# Patient Record
Sex: FEMALE
[De-identification: names, ages, dates, MRNs, and addresses within clinical notes are randomized per-mention and may not be internally consistent; named-entity substitution may affect disease eponyms.]

---

## 2017-08-28 ENCOUNTER — HOSPITAL ENCOUNTER (EMERGENCY)
Dept: HOSPITAL 42 - ED | Age: 57
Discharge: HOME | End: 2017-08-28
Payer: COMMERCIAL

## 2017-08-28 VITALS — RESPIRATION RATE: 18 BRPM | TEMPERATURE: 98.4 F

## 2017-08-28 VITALS — HEART RATE: 67 BPM | DIASTOLIC BLOOD PRESSURE: 60 MMHG | OXYGEN SATURATION: 100 % | SYSTOLIC BLOOD PRESSURE: 113 MMHG

## 2017-08-28 VITALS — BODY MASS INDEX: 27.6 KG/M2

## 2017-08-28 DIAGNOSIS — M54.5: Primary | ICD-10-CM

## 2017-08-28 NOTE — ED PDOC
Arrival/HPI





- General


Historian: Patient





- History of Present Illness


Time/Duration: < week


Symptom Onset: Gradual


Symptom Course: Unchanged


Severity Level: Mild


Activities at Onset: Light


Context: Home





- General


Chief Complaint: Upper Extremity Problem/Injury


Time Seen by Provider: 08/28/17 07:42





- History of Present Illness


Narrative History of Present Illness (Text): 





08/28/17 07:45





Milagro De Leon is 57 year old female, whose past medical history includes 

hypertension, diabetes, GERD, and multiple hernia repairs, who presents to the 

emergency department complaining of left lower back pain for 3 days. Patient 

states that her pain worsens when she lies down but is better when area is 

palpated. Patient notes that she took Naproxen 500 mg which brought little 

relief.  Patient also indicates a pinched nerve to her right shoulder which 

shes states is chronic and has been intermittent for a few years. Patient 

denies any hematuria, nausea, vomiting, diarrhea, constipation, or any other 

complaints at this time.





PMD: Reese Smith)





Past Medical History





- Provider Review


Nursing Documentation Reviewed: Yes





- Infectious Disease


Hx of Infectious Diseases: None





- Tetanus Immunization


Tetanus Immunization: Unknown





- Reproductive


Menopause: Yes





- Cardiac


Hx Cardiac Disorders: Yes


Hx Hypertension: Yes


Hx Pacemaker: No





- Pulmonary


Hx Respiratory Disorders: No





- Neurological


Hx Neurological Disorder: No


Hx Paralysis: No





- HEENT


Hx HEENT Disorder: No





- Renal


Hx Renal Disorder: No





- Endocrine/Metabolic


Hx Endocrine Disorders: Yes


Hx Diabetes Mellitus Type 2: Yes





- Hematological/Oncological


Hx Blood Disorders: No


Hx Blood Transfusions: No


Hx Blood Transfusion Reaction: No





- Integumentary


Hx Dermatological Disorder: No





- Musculoskeletal/Rheumatological


Hx Musculoskeletal Disorders: No





- Gastrointestinal


Hx Gastrointestinal Disorders: No


Hx Gastroesophageal Reflux: Yes





- Genitourinary/Gynecological


Hx Genitourinary Disorders: No





- Psychiatric


Hx Psychophysiologic Disorder: No


Hx Physical Abuse: No


Hx Substance Use: No





- Surgical History


Other/Comment: Abdominal Hernia Repair





- Anesthesia


Hx Anesthesia: Yes


Hx Anesthesia Reactions: No


Hx Malignant Hyperthermia: No





- Suicidal Assessment


Feels Threatened In Home Enviroment: No





Family/Social History





- Physician Review


Nursing Documentation Reviewed: Yes


Family/Social History: Diabetes, Hypertension


Smoking Status: Never Smoked


Hx Alcohol Use: No


Hx Substance Use: No


Hx Substance Use Treatment: No





Allergies/Home Meds


Allergies/Adverse Reactions: 


Allergies





No Known Allergies Allergy (Verified 08/28/17 07:44)


 








Home Medications: 


 Home Meds











 Medication  Instructions  Recorded  Confirmed


 


Enalapril Maleate [Enalapril 10 mg PO DAILY 02/17/16 08/28/17





Maleate]   


 


Metformin HCl [Glucophage] 500 mg PO BID 02/17/16 08/28/17














Review of Systems





- Review of Systems


Constitutional: absent: Fevers, Night Sweats


Eyes: absent: Vision Changes


ENT: absent: Hearing Changes


Respiratory: absent: SOB


Cardiovascular: absent: Chest Pain


Gastrointestinal: absent: Abdominal Pain


Genitourinary Female: absent: Dysuria


Musculoskeletal: Back Pain, Other (Joint )


Skin: absent: Rash


Neurological: absent: Headache





Physical Exam


Vital Signs Reviewed: Yes


Temperature: Afebrile


Blood Pressure: Hypertensive


Pulse: Regular


Respiratory Rate: Normal


Appearance: Positive for: Well-Appearing, Non-Toxic, Comfortable


Pain Distress: None


Mental Status: Positive for: Alert and Oriented X 3





- Systems Exam


Head: Present: Atraumatic, Normocephalic


Pupils: Present: PERRL


Extroacular Muscles: Present: EOMI


Conjunctiva: Present: Normal


Mouth: Present: Moist Mucous Membranes


Neck: Present: Normal Range of Motion


Respiratory/Chest: Present: Clear to Auscultation, Good Air Exchange.  No: 

Respiratory Distress, Accessory Muscle Use


Cardiovascular: Present: Regular Rate and Rhythm, Normal S1, S2.  No: Murmurs


Abdomen: Present: Normal Bowel Sounds.  No: Tenderness, Distention, Peritoneal 

Signs


Back: Present: Paraspinal Tenderness (slight tenderness to left lower area)


Upper Extremity: Present: Normal Inspection.  No: Cyanosis, Edema


Lower Extremity: Present: Normal Inspection.  No: Edema


Neurological: Present: GCS=15, CN II-XII Intact, Speech Normal


Skin: Present: Warm, Dry, Normal Color.  No: Rashes


Psychiatric: Present: Alert, Oriented x 3, Normal Insight, Normal Concentration


Vital Signs











  Temp Pulse Resp BP Pulse Ox


 


 08/28/17 09:45   67  18  113/60  100


 


 08/28/17 07:46  98.4 F  76  18  128/94 H  98














Medical Decision Making


ED Course and Treatment: 





08/28/17 08:03


Impression:





57 year old female complaining of left lower back pain for 3 days.   





Differential Diagnosis included but are not limited to:  Muscle Strain vs. Disc 

herniation





Plan:


-- Lidoderm, Tylenol, and Flexeril


-- Reassess and disposition





Prior Visits:


Notes and results from previous visits were reviewed. Patient last seen in the 

ED on 03/11/17 for 4-day duration of eye redness. Patient was discharged home. 





Progress Notes:








08/28/17 10:11


On  reevaluation, patient felt much better. She was able to move from laying to 

sitting and then walked with no pain or ataxia. Patient will make sure to 

follow up with her PMD in 1-2 days. 


 (Reese Jones)





- Medication Orders


Current Medication Orders: 











Discontinued Medications





Acetaminophen (Tylenol 325mg Tab)  975 mg PO STAT STA


   Stop: 08/28/17 08:17


   Last Admin: 08/28/17 08:35  Dose: 975 mg





Cyclobenzaprine HCl (Flexeril)  10 mg PO STAT STA


   Stop: 08/28/17 08:24


   Last Admin: 08/28/17 08:38  Dose: 10 mg





Lidocaine (Lidoderm)  1 ea TD STAT STA


   Stop: 08/28/17 08:17


   Last Admin: 08/28/17 08:35  Dose: 1 ea











- Scribe Statement


The provider has reviewed the documentation as recorded by the Scribe





- Scribe Statement


Susanne Castelan





Provider Scribe Attestation:


All medical record entries made by the Scribe were at my direction and 

personally dictated by me. I have reviewed the chart and agree that the record 

accurately reflects my personal performance of the history, physical exam, 

medical decision making, and the department course for this patient. I have 

also personally directed, reviewed, and agree with the discharge instructions 

and disposition. (Reese Jones)





Disposition/Present on Arrival





- Present on Arrival


Any Indicators Present on Arrival: No


History of DVT/PE: No


History of Uncontrolled Diabetes: No


Urinary Catheter: No


History of Decub. Ulcer: No


History Surgical Site Infection Following: Abdominal Surgery





- Disposition


Have Diagnosis and Disposition been Completed?: Yes


Disposition Time: 10:11


Patient Plan: Discharge





- Disposition


Diagnosis: 


 Back pain





Disposition: HOME/ ROUTINE


Condition: IMPROVED


Discharge Instructions (ExitCare):  Lumbar Radiculopathy (ED)


Additional Instructions: 





Ms Hanks, thank you for letting us take care of you today. Your provider was 

Dr. Jones. You were treated for Lumbar back pain. The emergency medical care 

you received today was directed at your acute symptoms. If you were prescribed 

any medication, please fill it and take as directed. It may take several days 

for your symptoms to resolve. Return to the Emergency Department if your 

symptoms worsen, do not improve, or if you have any other problems.





Please contact your doctor or call one of the physicians/clinics you have been 

referred to that are listed on the Patient Visit Information form that is 

included in your discharge packet. Bring any paperwork you were given at 

discharge with you along with any medications you are taking to your follow up 

visit. Our treatment cannot replace ongoing medical care by a primary care 

provider (PCP) outside of the emergency department.





Thank you for allowing the [facility] team to be part of your care today.








If you had an X-Ray or CT scan: A Radiologist will review the ED reading if any 

change in treatment is needed we will contact you.***





If you had a blood, urine, or wound culture: It will take several days for the 

results, if any change in treatment is needed we will contact you.***





If you had an STI test: It will take 48 hours for the results. Please call 

after 1 week if you have not heard back.***


Prescriptions: 


Cyclobenzaprine [Flexeril] 5 mg PO TID PRN #20 tab


 PRN Reason: Muscle Spasm


Lidocaine 5% [Lidoderm] 1 each TP DAILY PRN #3 patch


 PRN Reason: Muscle Spasm


Naproxen 500 mg PO BID PRN #30 tab


 PRN Reason: Pain, Moderate (4-7)


Referrals: 


Stefan Huitron DO [Primary Care Provider] - Follow up with primary


Forms:  [employer] ([employer]), WORK NOTE

## 2019-01-25 ENCOUNTER — HOSPITAL ENCOUNTER (EMERGENCY)
Dept: HOSPITAL 42 - ED | Age: 59
Discharge: HOME | End: 2019-01-25
Payer: SELF-PAY

## 2019-01-25 VITALS — OXYGEN SATURATION: 97 % | SYSTOLIC BLOOD PRESSURE: 120 MMHG | HEART RATE: 71 BPM | DIASTOLIC BLOOD PRESSURE: 51 MMHG

## 2019-01-25 VITALS — BODY MASS INDEX: 27.4 KG/M2

## 2019-01-25 VITALS — TEMPERATURE: 98.4 F | RESPIRATION RATE: 18 BRPM

## 2019-01-25 DIAGNOSIS — E11.9: ICD-10-CM

## 2019-01-25 DIAGNOSIS — I10: ICD-10-CM
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
## 2019-01-25 DIAGNOSIS — S63.501A: Primary | ICD-10-CM

## 2019-01-25 NOTE — ED PDOC
Arrival/HPI





- General


Chief Complaint: Finger,Hand,&Wrist


Time Seen by Provider: 01/25/19 18:52


Historian: Patient





- History of Present Illness


Narrative History of Present Illness (Text): 





01/25/19 19:39





A 58 year old female presents to the emergency department with a complaint of 

right wrist pain s/p fall. Patient reports that she fell over a shopping cart 

today and fell onto the sidewalk. She reports falling onto her right wrist and 

left knee. The patient denies LOC, head trauma, fevers, chills, headache, 

dizziness, chest pain, shortness of breath, dyspnea on exertion, cough, 

abdominal pain, nausea, vomiting, diarrhea, back pain, neck pain, other 

extremity pain, urinary/bowel changes, or any other complaint.


Time/Duration: Prior to Arrival


Symptom Onset: Sudden


Symptom Course: Unchanged


Activities at Onset: Rest, Light


Context: Walking





Past Medical History





- Provider Review


Nursing Documentation Reviewed: Yes





- Infectious Disease


Hx of Infectious Diseases: None





- Tetanus Immunization


Tetanus Immunization: Unknown





- Reproductive


Menopause: Yes





- Cardiac


Hx Cardiac Disorders: Yes


Hx Hypertension: Yes


Hx Pacemaker: No





- Pulmonary


Hx Respiratory Disorders: No





- Neurological


Hx Neurological Disorder: No


Hx Paralysis: No





- HEENT


Hx HEENT Disorder: No





- Renal


Hx Renal Disorder: No





- Endocrine/Metabolic


Hx Endocrine Disorders: Yes


Hx Diabetes Mellitus Type 2: Yes





- Hematological/Oncological


Hx Blood Disorders: No


Hx Blood Transfusions: No


Hx Blood Transfusion Reaction: No





- Integumentary


Hx Dermatological Disorder: No





- Musculoskeletal/Rheumatological


Hx Musculoskeletal Disorders: No





- Gastrointestinal


Hx Gastrointestinal Disorders: No


Hx Gastroesophageal Reflux: Yes





- Genitourinary/Gynecological


Hx Genitourinary Disorders: No





- Psychiatric


Hx Psychophysiologic Disorder: No


Hx Physical Abuse: No


Hx Substance Use: No





- Surgical History


Hx Breast Biopsy: Yes


Other/Comment: Abdominal Hernia Repair





- Anesthesia


Hx Anesthesia: Yes


Hx Anesthesia Reactions: No


Hx Malignant Hyperthermia: No





- Suicidal Assessment


Feels Threatened In Home Enviroment: No





Family/Social History





- Physician Review


Nursing Documentation Reviewed: Yes


Family/Social History: No Known Family HX


Smoking Status: Never Smoked


Hx Alcohol Use: No


Hx Substance Use: No


Hx Substance Use Treatment: No





Allergies/Home Meds


Allergies/Adverse Reactions: 


Allergies





No Known Allergies Allergy (Verified 01/25/19 18:51)


   








Home Medications: 


                                    Home Meds











 Medication  Instructions  Recorded  Confirmed


 


Enalapril Maleate 10 mg PO DAILY 02/17/16 08/28/17


 


Metformin HCl [Glucophage] 500 mg PO BID 02/17/16 08/28/17














Review of Systems





- Physician Review


All systems were reviewed & negative as marked: Yes





- Review of Systems


Constitutional: absent: Fevers


Respiratory: absent: SOB, Cough


Cardiovascular: absent: Chest Pain, PICKETT


Gastrointestinal: absent: Abdominal Pain, Stool Changes, Diarrhea, Nausea, 

Vomiting


Genitourinary Female: absent: Urine Output Changes


Musculoskeletal: Other (Right wrist pain. ).  absent: Back Pain, Neck Pain


Neurological: absent: Headache, Dizziness





Physical Exam


Vital Signs Reviewed: Yes





Vital Signs











  Temp Pulse Resp BP Pulse Ox


 


 01/25/19 18:55  98.4 F  88  18  152/85 H  95











Temperature: Afebrile


Blood Pressure: Hypertensive


Pulse: Regular


Respiratory Rate: Normal


Appearance: Positive for: Well-Appearing, Non-Toxic, Comfortable


Pain Distress: None


Mental Status: Positive for: Alert and Oriented X 3





- Systems Exam


Head: Present: Atraumatic, Normocephalic


Pupils: Present: PERRL


Extroacular Muscles: Present: EOMI


Conjunctiva: Present: Normal


Mouth: Present: Moist Mucous Membranes


Neck: Present: Normal Range of Motion


Respiratory/Chest: Present: Clear to Auscultation, Good Air Exchange.  No: 

Respiratory Distress, Accessory Muscle Use


Cardiovascular: Present: Regular Rate and Rhythm, Normal S1, S2.  No: Murmurs


Abdomen: No: Tenderness, Distention, Peritoneal Signs


Back: Present: Normal Inspection


Upper Extremity: Present: Normal Inspection, NORMAL PULSES, Tenderness 

(tenderness to right wrist), Swelling (swelling to right wrist), Neurovascularly

 Intact, Capillary Refill < 2s, Norm 2-Pt Discrimination.  No: Cyanosis, Edema, 

Normal ROM (Limited ROM of right wrist.), Temperature Abnormalties, Deformity


Lower Extremity: Present: Normal Inspection, NORMAL PULSES, Normal ROM, 

Neurovascularly Intact, Capillary Refill < 2 s.  No: Edema, Tenderness, 

Swelling, Deformity, Temperature Abnormalties


Neurological: Present: GCS=15, CN II-XII Intact, Speech Normal, Motor Func 

Grossly Intact, Normal Sensory Function


Skin: Present: Warm, Dry, Normal Color, Abrasion (abrasion to left knee.).  No: 

Rashes


Psychiatric: Present: Alert, Oriented x 3, Normal Insight, Normal Concentration





Medical Decision Making


ED Course and Treatment: 





01/25/19 19:44





Impression:


A 58 year old female presents to the emergency department with a complaint of 

right wrist pain and left knee abrasion s/p fall. 





Plan:


-- Right Wrist X- Ray


-- Anaprox


-- Reassess and disposition








Progress Notes:


XR right wrist : no fracture, no dislocation, as read by PA


Patient advised that official radiology read of XR is still pending and will 

call the patient if there is any discrepancy within 24 hours.  








On reevaluation, patient remains awake alert and oriented 3 in no acute 

distress. XR results d/w the patient. Orthoglass volar splint applied. 

Neurovascular intact post splint application. 





Advised to follow up with ortho referral in 1-2 days without fail. Advised to 

take medication as prescribed. Return to the emergency room at any time for any 

new or worsening symptoms.





Patient states she fully agrees with and understands discharge instructions. 

States that she agrees with the plan and disposition. Verbalized and repeated 

discharge instructions and plan. I have given the patient opportunity to ask any

 additional questions.











- RAD Interpretation


Radiology Orders: 











01/25/19 19:33


WRIST, RIGHT 3 VIEWS [RAD] Stat 














- Medication Orders


Current Medication Orders: 














Discontinued Medications





Naproxen (Anaprox Ds)  550 mg PO ONCE STA


   Stop: 01/25/19 19:19


   Last Admin: 01/25/19 19:27  Dose: 550 mg











- PA / NP / Resident Statement


MD/DO has reviewed & agrees with the documentation as recorded.





- Scribe Statement


The provider has reviewed the documentation as recorded by the Scribe





Betzaida Beasley 





Provider Scribe Attestation:


All medical record entries made by the Scribe were at my direction and 

personally dictated by me. I have reviewed the chart and agree that the record 

accurately reflects my personal performance of the history, physical exam, 

medical decision making, and the department course for this patient. I have also

 personally directed, reviewed, and agree with the discharge instructions and 

disposition.








Disposition/Present on Arrival





- Present on Arrival


Any Indicators Present on Arrival: No


History of DVT/PE: No


History of Uncontrolled Diabetes: No


Urinary Catheter: No


History of Decub. Ulcer: No


History Surgical Site Infection Following: None





- Disposition


Have Diagnosis and Disposition been Completed?: Yes


Diagnosis: 


 Right wrist sprain





Disposition: HOME/ ROUTINE


Disposition Time: 20:00


Patient Plan: Discharge


Patient Problems: 


                             Current Active Problems











Problem Status Onset


 


Right wrist sprain Acute 











Condition: STABLE


Discharge Instructions (ExitCare):  Wrist Sprain (DC)


Print Language: German


Additional Instructions: 


Thank you for letting us take care of you today. You were treated for R wrist 

sprain. The emergency medical care you received today was directed at your acute

 symptoms. If you were prescribed any medication, please fill it and take as 

directed. It may take several days for your symptoms to resolve. Return to the 

Emergency Department if your symptoms worsen, do not improve, or if you have any

 other problems.





Please follow up with orthopedic referral in 2 days for re-evaluation and follow

 up. Bring any paperwork you were given at discharge with you along with any 

medications you are taking to your follow up visit. Our treatment cannot replace

 ongoing medical care by a primary care provider (PCP) outside of the emergency 

department.





Thank you for allowing the [facility] team to be part of your care today.








If you had an X-Ray : A Radiologist will review the ED reading if any change in 

treatment is needed we will contact you.***





Referrals: 


Ortega Amato III, MD [Medical Doctor] - Follow up with primary


Forms:  [employer] (English), WORK NOTE

## 2019-01-26 NOTE — RAD
Date of service: 



01/25/2019



PROCEDURE:  Right Wrist Radiographs.







HISTORY:

pain



COMPARISON:

None.



FINDINGS:



BONES:

Normal. No fracture.



JOINTS:

Normal. No dislocation. 



SOFT TISSUES:

Normal. 



OTHER FINDINGS:

None.



IMPRESSION:

No evidence of acute fracture or dislocation.